# Patient Record
Sex: FEMALE | Race: WHITE | NOT HISPANIC OR LATINO | Employment: STUDENT | URBAN - METROPOLITAN AREA
[De-identification: names, ages, dates, MRNs, and addresses within clinical notes are randomized per-mention and may not be internally consistent; named-entity substitution may affect disease eponyms.]

---

## 2017-09-12 ENCOUNTER — HISTORICAL (OUTPATIENT)
Dept: RADIOLOGY | Facility: HOSPITAL | Age: 27
End: 2017-09-12

## 2017-09-21 ENCOUNTER — HISTORICAL (OUTPATIENT)
Dept: RADIOLOGY | Facility: HOSPITAL | Age: 27
End: 2017-09-21

## 2018-11-07 ENCOUNTER — HISTORICAL (OUTPATIENT)
Dept: LAB | Facility: HOSPITAL | Age: 28
End: 2018-11-07

## 2019-06-21 ENCOUNTER — HISTORICAL (OUTPATIENT)
Dept: RADIOLOGY | Facility: HOSPITAL | Age: 29
End: 2019-06-21

## 2019-06-25 ENCOUNTER — HISTORICAL (OUTPATIENT)
Dept: RADIOLOGY | Facility: HOSPITAL | Age: 29
End: 2019-06-25

## 2022-06-06 DIAGNOSIS — D64.9 ANEMIA: Primary | ICD-10-CM

## 2023-11-11 ENCOUNTER — HOSPITAL ENCOUNTER (EMERGENCY)
Facility: HOSPITAL | Age: 33
Discharge: HOME | End: 2023-11-11
Attending: STUDENT IN AN ORGANIZED HEALTH CARE EDUCATION/TRAINING PROGRAM
Payer: MEDICAID

## 2023-11-11 VITALS
TEMPERATURE: 99 F | WEIGHT: 140 LBS | DIASTOLIC BLOOD PRESSURE: 101 MMHG | BODY MASS INDEX: 26.43 KG/M2 | HEIGHT: 61 IN | RESPIRATION RATE: 18 BRPM | HEART RATE: 87 BPM | SYSTOLIC BLOOD PRESSURE: 141 MMHG | OXYGEN SATURATION: 97 %

## 2023-11-11 DIAGNOSIS — K04.7 DENTAL ABSCESS: Primary | ICD-10-CM

## 2023-11-11 PROCEDURE — 99284 EMERGENCY DEPT VISIT MOD MDM: CPT

## 2023-11-11 PROCEDURE — 41800 DRAINAGE OF GUM LESION: CPT

## 2023-11-11 PROCEDURE — 25000003 PHARM REV CODE 250: Performed by: NURSE PRACTITIONER

## 2023-11-11 RX ORDER — TRAMADOL HYDROCHLORIDE 50 MG/1
50 TABLET ORAL EVERY 8 HOURS PRN
Qty: 12 TABLET | Refills: 0 | Status: SHIPPED | OUTPATIENT
Start: 2023-11-11 | End: 2023-11-11 | Stop reason: SDUPTHER

## 2023-11-11 RX ORDER — LIDOCAINE HYDROCHLORIDE 20 MG/ML
JELLY TOPICAL
Status: COMPLETED | OUTPATIENT
Start: 2023-11-11 | End: 2023-11-11

## 2023-11-11 RX ORDER — CLINDAMYCIN HYDROCHLORIDE 300 MG/1
300 CAPSULE ORAL EVERY 8 HOURS
Qty: 21 CAPSULE | Refills: 0 | Status: SHIPPED | OUTPATIENT
Start: 2023-11-11 | End: 2023-11-18

## 2023-11-11 RX ORDER — LIDOCAINE HYDROCHLORIDE 20 MG/ML
5 SOLUTION OROPHARYNGEAL
Status: DISCONTINUED | OUTPATIENT
Start: 2023-11-11 | End: 2023-11-11 | Stop reason: HOSPADM

## 2023-11-11 RX ORDER — TRAMADOL HYDROCHLORIDE 50 MG/1
50 TABLET ORAL EVERY 8 HOURS PRN
Qty: 12 TABLET | Refills: 0 | Status: SHIPPED | OUTPATIENT
Start: 2023-11-11 | End: 2023-11-15

## 2023-11-11 RX ORDER — CHLORHEXIDINE GLUCONATE ORAL RINSE 1.2 MG/ML
15 SOLUTION DENTAL 2 TIMES DAILY
Qty: 300 ML | Refills: 0 | Status: SHIPPED | OUTPATIENT
Start: 2023-11-11 | End: 2023-11-21

## 2023-11-11 RX ORDER — CLINDAMYCIN HYDROCHLORIDE 300 MG/1
300 CAPSULE ORAL
Status: COMPLETED | OUTPATIENT
Start: 2023-11-11 | End: 2023-11-11

## 2023-11-11 RX ADMIN — LIDOCAINE HYDROCHLORIDE 10 ML: 20 JELLY TOPICAL at 03:11

## 2023-11-11 RX ADMIN — CLINDAMYCIN HYDROCHLORIDE 300 MG: 300 CAPSULE ORAL at 03:11

## 2023-11-11 NOTE — ED PROVIDER NOTES
Encounter Date: 11/11/2023       History     Chief Complaint   Patient presents with    Facial Swelling     C/o lower right jaw facial swelling X 1 day. States that she is on day 3 of a prescription of Amoxicillin     See MDM    The history is provided by the patient. No  was used.     Review of patient's allergies indicates:  No Known Allergies  No past medical history on file.  No past surgical history on file.  No family history on file.     Review of Systems   HENT:  Positive for dental problem.    All other systems reviewed and are negative.      Physical Exam     Initial Vitals [11/11/23 1435]   BP Pulse Resp Temp SpO2   (!) 141/101 87 18 98.6 °F (37 °C) 97 %      MAP       --         Physical Exam    Nursing note and vitals reviewed.  Constitutional: She appears well-developed and well-nourished.   HENT:   Mouth/Throat: No trismus in the jaw. Abnormal dentition. Dental abscesses (right lower) and dental caries present.   Eyes: Conjunctivae are normal.   Cardiovascular:  Normal rate.           Pulmonary/Chest: No respiratory distress.     Neurological: She is alert and oriented to person, place, and time.   Skin: Skin is warm and dry.   Psychiatric: She has a normal mood and affect.         ED Course   Nerve Block    Date/Time: 11/11/2023 4:05 PM  Location procedure was performed: [facility] EMERGENCY DEPARTMENT    Performed by: Sandhya Cardoso FNP  Authorized by: Sandhya Cardoso FNP  Assisting provider: Davi Ordoñez MD  Consent Done: Yes  Consent: Verbal consent obtained.  Consent given by: patient  Patient understanding: patient states understanding of the procedure being performed  Patient identity confirmed: name  Indications: pain relief  Body area: face/mouth  Nerve: inferior alveolar  Laterality: right    Patient sedated: no  Patient position: sitting  Needle size: 25 G  Location technique: anatomical landmarks  Local Anesthetic: bupivacaine 0.25% without epinephrine  Anesthetic  total: 1 mL  Complications: No  Specimens: No  Implants: No  Outcome: pain improved  Patient tolerance: Patient tolerated the procedure well with no immediate complications      I & D - Incision and Drainage    Date/Time: 11/11/2023 4:20 PM  Location procedure was performed: [facility] EMERGENCY DEPARTMENT    Performed by: Sandhya Cardoso FNP  Authorized by: Davi Ordoñez MD  Consent Done: Yes  Consent: Verbal consent obtained.  Risks and benefits: risks, benefits and alternatives were discussed  Consent given by: patient  Patient understanding: patient states understanding of the procedure being performed  Type: abscess  Body area: mouth  Location details: floor of mouth  Anesthesia: see MAR for details    Anesthesia:  Local Anesthetic: topical anesthetic    Patient sedated: no  Scalpel size: 11  Complexity: simple  Drainage: bloody  Drainage amount: scant  Wound treatment: incision and drainage  Complications: No  Specimens: No  Implants: No  Patient tolerance: Patient tolerated the procedure well with no immediate complications        Labs Reviewed - No data to display       Imaging Results    None          Medications   LIDOcaine HCl 2% oral solution 5 mL (has no administration in time range)   clindamycin capsule 300 mg (300 mg Oral Given 11/11/23 1511)   LIDOcaine HCl 2% urojet (10 mLs Mucous Membrane Given 11/11/23 1511)     Medical Decision Making  34 y/o female presents with right lower dental pain and facial swelling which seems to be getting worse. No fever. States on day 3 of augmentin that she got from urgent care. Can't see dentist yet due to not having funds for fee.     Dental block performed. Relief given. Drained area of abscess, mostly blood with some pus noted. Will switch to clindamycin since patient has now been on 3 days of augmentin. Will add peridex.     Risk  Prescription drug management.      Additional MDM:   Differential Diagnosis:   Other: The following diagnoses were also considered  and will be evaluated: dental abscess, dental caries and dental fracture.                            Clinical Impression:   Final diagnoses:  [K04.7] Dental abscess (Primary)        ED Disposition Condition    Discharge Stable          ED Prescriptions       Medication Sig Dispense Start Date End Date Auth. Provider    clindamycin (CLEOCIN) 300 MG capsule Take 1 capsule (300 mg total) by mouth every 8 (eight) hours. for 7 days 21 capsule 11/11/2023 11/18/2023 Sandhya Cardoso FNP    traMADoL (ULTRAM) 50 mg tablet Take 1 tablet (50 mg total) by mouth every 8 (eight) hours as needed for Pain. 12 tablet 11/11/2023 11/15/2023 Sandhya Cardoso FNP    chlorhexidine (PERIDEX) 0.12 % solution Use as directed 15 mLs in the mouth or throat 2 (two) times daily. for 10 days 300 mL 11/11/2023 11/21/2023 Sandhya Cardoso FNP          Follow-up Information       Follow up With Specialties Details Why Contact Info    Linn Antony NP Family Medicine Call in 1 week As needed, If symptoms worsen PO   [facility] [id number]  [phone]               Sandhya Cardoso FNP  11/11/23 0655

## 2023-11-11 NOTE — ED TRIAGE NOTES
C/o lower right jaw facial swelling X 1 day. States that she is on day 3 of a prescription of Amoxicillin

## 2023-11-11 NOTE — DISCHARGE INSTRUCTIONS
Peridex swish and spit as directed. Tylenol and/or ibuprofen for pain. May take tramadol for more severe pain as needed, do not take and drive. Follow up with dentist.

## 2024-08-03 ENCOUNTER — HOSPITAL ENCOUNTER (EMERGENCY)
Facility: HOSPITAL | Age: 34
Discharge: HOME | End: 2024-08-04
Attending: STUDENT IN AN ORGANIZED HEALTH CARE EDUCATION/TRAINING PROGRAM
Payer: COMMERCIAL

## 2024-08-03 VITALS
BODY MASS INDEX: 27.38 KG/M2 | RESPIRATION RATE: 18 BRPM | SYSTOLIC BLOOD PRESSURE: 140 MMHG | WEIGHT: 145 LBS | OXYGEN SATURATION: 98 % | TEMPERATURE: 99 F | HEIGHT: 61 IN | HEART RATE: 89 BPM | DIASTOLIC BLOOD PRESSURE: 85 MMHG

## 2024-08-03 DIAGNOSIS — R52 PAIN: ICD-10-CM
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
## 2024-08-03 DIAGNOSIS — S80.01XA CONTUSION OF RIGHT KNEE, INITIAL ENCOUNTER: ICD-10-CM

## 2024-08-03 PROCEDURE — 25000003 PHARM REV CODE 250: Performed by: STUDENT IN AN ORGANIZED HEALTH CARE EDUCATION/TRAINING PROGRAM

## 2024-08-03 PROCEDURE — 99283 EMERGENCY DEPT VISIT LOW MDM: CPT | Mod: 25

## 2024-08-03 RX ORDER — HYDROCODONE BITARTRATE AND ACETAMINOPHEN 5; 325 MG/1; MG/1
1 TABLET ORAL
Status: COMPLETED | OUTPATIENT
Start: 2024-08-03 | End: 2024-08-03

## 2024-08-03 RX ADMIN — HYDROCODONE BITARTRATE AND ACETAMINOPHEN 1 TABLET: 5; 325 TABLET ORAL at 11:08

## 2024-08-04 RX ORDER — TRAMADOL HYDROCHLORIDE 50 MG/1
50 TABLET ORAL EVERY 6 HOURS PRN
Qty: 12 TABLET | Refills: 0 | Status: SHIPPED | OUTPATIENT
Start: 2024-08-04

## 2025-04-07 ENCOUNTER — OFFICE VISIT (OUTPATIENT)
Dept: FAMILY MEDICINE | Facility: CLINIC | Age: 35
End: 2025-04-07
Payer: MEDICAID

## 2025-04-07 VITALS
BODY MASS INDEX: 24.17 KG/M2 | SYSTOLIC BLOOD PRESSURE: 117 MMHG | RESPIRATION RATE: 20 BRPM | HEIGHT: 61 IN | DIASTOLIC BLOOD PRESSURE: 77 MMHG | TEMPERATURE: 98 F | HEART RATE: 71 BPM | WEIGHT: 128 LBS

## 2025-04-07 DIAGNOSIS — F31.9 BIPOLAR 1 DISORDER, DEPRESSED: ICD-10-CM

## 2025-04-07 DIAGNOSIS — J30.9 ALLERGIC SINUSITIS: ICD-10-CM

## 2025-04-07 DIAGNOSIS — H60.90 OTITIS EXTERNA, UNSPECIFIED CHRONICITY, UNSPECIFIED LATERALITY, UNSPECIFIED TYPE: ICD-10-CM

## 2025-04-07 DIAGNOSIS — J34.2 DEVIATED SEPTUM: ICD-10-CM

## 2025-04-07 DIAGNOSIS — F32.A DEPRESSION, UNSPECIFIED DEPRESSION TYPE: ICD-10-CM

## 2025-04-07 RX ORDER — NEOMYCIN SULFATE, POLYMYXIN B SULFATE AND HYDROCORTISONE 10; 3.5; 1 MG/ML; MG/ML; [USP'U]/ML
3 SUSPENSION/ DROPS AURICULAR (OTIC) 3 TIMES DAILY
Qty: 10 ML | Refills: 0 | Status: SHIPPED | OUTPATIENT
Start: 2025-04-07 | End: 2025-04-14

## 2025-04-07 RX ORDER — MONTELUKAST SODIUM 10 MG/1
10 TABLET ORAL NIGHTLY
Qty: 30 TABLET | Refills: 6 | Status: SHIPPED | OUTPATIENT
Start: 2025-04-07 | End: 2025-11-03

## 2025-04-07 RX ORDER — LORATADINE 10 MG/1
10 TABLET ORAL DAILY
Qty: 30 TABLET | Refills: 6 | Status: SHIPPED | OUTPATIENT
Start: 2025-04-07 | End: 2026-04-07

## 2025-04-07 RX ORDER — LAMOTRIGINE 100 MG/1
100 TABLET ORAL DAILY
Qty: 30 TABLET | Refills: 11 | Status: SHIPPED | OUTPATIENT
Start: 2025-04-07 | End: 2026-04-07

## 2025-04-07 RX ORDER — FLUTICASONE PROPIONATE 50 MCG
1 SPRAY, SUSPENSION (ML) NASAL DAILY
Qty: 18 G | Refills: 6 | Status: SHIPPED | OUTPATIENT
Start: 2025-04-07

## 2025-04-07 RX ORDER — AZELASTINE 1 MG/ML
1 SPRAY, METERED NASAL 2 TIMES DAILY
Qty: 3 ML | Refills: 6 | Status: SHIPPED | OUTPATIENT
Start: 2025-04-07 | End: 2026-04-07

## 2025-04-07 RX ORDER — VENLAFAXINE HYDROCHLORIDE 75 MG/1
75 CAPSULE, EXTENDED RELEASE ORAL DAILY
Qty: 30 CAPSULE | Refills: 11 | Status: SHIPPED | OUTPATIENT
Start: 2025-04-07 | End: 2026-04-07

## 2025-04-07 RX ORDER — METHYLPREDNISOLONE ACETATE 40 MG/ML
40 INJECTION, SUSPENSION INTRA-ARTICULAR; INTRALESIONAL; INTRAMUSCULAR; SOFT TISSUE
Status: COMPLETED | OUTPATIENT
Start: 2025-04-07 | End: 2025-04-07

## 2025-04-07 RX ADMIN — METHYLPREDNISOLONE ACETATE 40 MG: 40 INJECTION, SUSPENSION INTRA-ARTICULAR; INTRALESIONAL; INTRAMUSCULAR; SOFT TISSUE at 10:04

## 2025-04-07 NOTE — PROGRESS NOTES
"Subjective:       Patient ID: Barbie Marlow is a 34 y.o. female.    Chief Complaint: Depression (Discuss getting back on medication), Otalgia (Earache left ear X's 2 weeks), and Sinus Problem (Sinus pressure, sinus drip X's 1 month)      The patient is a 34-year-old female who presents for depression and bipolar disorder.  The patient states she has been off of her medication for about a year but she feels like she really needs it.  She states she is feeling down most days and she feels isolated.  She also states that she does not feel like doing anything.  She is asking me to start her back up on Lamictal and Effexor.  I will start her on a low-dose of each and send her to psychiatric for medication management.    The patient is complaining of sinus problems.  Patient states she has a sinus headache, she has thick sinus drainage that she is having a hard time getting to come up.  She also states she has a left earache.      Review of [id number] point review of systems conducted, negative except as stated in the history of present illness. See HPI for details.      Objective:      Visit Vitals  /77   Pulse 71   Temp 98 °F (36.7 °C)   Resp 20   Ht 5' 1" (1.549 m)   Wt 58.1 kg (128 lb)   LMP 03/27/2025   BMI 24.19 kg/m²     Physical Exam  Vitals and nursing note reviewed.   HENT:      Head: Normocephalic.      Right Ear: Tympanic membrane normal.      Ears:      Comments: Left ear canal is erythemic and left TM is coated inner layer of wax.     Nose: Congestion and rhinorrhea present.      Mouth/Throat:      Pharynx: Oropharyngeal exudate and posterior oropharyngeal erythema present.   Eyes:      Extraocular Movements: Extraocular movements intact.   Cardiovascular:      Rate and Rhythm: Normal rate and regular rhythm.      Heart sounds: No murmur heard.  Pulmonary:      Effort: Pulmonary effort is normal.      Breath sounds: Normal breath sounds.   Abdominal:      General: Bowel sounds are normal.    "   Palpations: Abdomen is soft.   Musculoskeletal:         General: Normal range of motion.      Cervical back: Normal range of motion and neck supple.   Skin:     General: Skin is warm and dry.   Neurological:      Mental Status: She is alert and oriented to person, place, and time.       Current Outpatient Medications   Medication Instructions    azelastine (ASTELIN) 137 mcg, Nasal, 2 times daily    fluticasone propionate (FLONASE) 50 mcg, Each Nostril, Daily    lamoTRIgine (LAMICTAL) 100 mg, Oral, Daily    loratadine (CLARITIN) 10 mg, Oral, Daily    montelukast (SINGULAIR) 10 mg, Oral, Nightly    neomycin-polymyxin-hydrocortisone (CORTISPORIN) 3.5-10,000-1 mg/mL-unit/mL-% otic suspension 3 drops, Left Ear, 3 times daily    venlafaxine (EFFEXOR-XR) 75 mg, Oral, Daily     has no known allergies.       Assessment:         ICD-10-CM ICD-9-CM   1. Allergic sinusitis  J30.9 477.9   2. Deviated septum  J34.2 470   3. Depression, unspecified depression type  F32.A 311   4. Bipolar 1 disorder, depressed  F31.9 296.50   5. Otitis externa, unspecified chronicity, unspecified laterality, unspecified type  H60.90 380.10          Plan:       1. Allergic sinusitis  - montelukast (SINGULAIR) 10 mg tablet; Take 1 tablet (10 mg total) by mouth every evening.  Dispense: 30 tablet; Refill: 6  - loratadine (CLARITIN) 10 mg tablet; Take 1 tablet (10 mg total) by mouth once daily.  Dispense: 30 tablet; Refill: 6    2. Deviated septum  - Ambulatory referral/consult to ENT; Future  - azelastine (ASTELIN) 137 mcg (0.1 %) nasal spray; 1 spray (137 mcg total) by Nasal route 2 (two) times daily.  Dispense: 3 mL; Refill: 6  - fluticasone propionate (FLONASE) 50 mcg/actuation nasal spray; 1 spray (50 mcg total) by Each Nostril route once daily.  Dispense: 18 g; Refill: 6    3. Depression, unspecified depression type  - venlafaxine (EFFEXOR-XR) 75 MG 24 hr capsule; Take 1 capsule (75 mg total) by mouth once daily.  Dispense: 30 capsule; Refill:  11  - Ambulatory referral/consult to Psychiatry; Future    4. Bipolar 1 disorder, depressed  - lamoTRIgine (LAMICTAL) 100 MG tablet; Take 1 tablet (100 mg total) by mouth once daily.  Dispense: 30 tablet; Refill: 11  - Ambulatory referral/consult to Psychiatry; Future    5. Otitis externa, unspecified chronicity, unspecified laterality, unspecified type  - neomycin-polymyxin-hydrocortisone (CORTISPORIN) 3.5-10,000-1 mg/mL-unit/mL-% otic suspension; Place 3 drops into the left ear 3 (three) times daily. for 7 days  Dispense: 10 mL; Refill: 0        Follow up in about 4 weeks (around 5/5/2025), or if symptoms worsen or fail to improve.     Future Appointments   Date Time Provider Department Center   4/28/2025  9:45 AM Nan Chao NP [facility]

## 2025-06-06 ENCOUNTER — HOSPITAL ENCOUNTER (EMERGENCY)
Facility: HOSPITAL | Age: 35
Discharge: HOME | End: 2025-06-06
Attending: FAMILY MEDICINE
Payer: MEDICAID

## 2025-06-06 VITALS
SYSTOLIC BLOOD PRESSURE: 132 MMHG | RESPIRATION RATE: 20 BRPM | OXYGEN SATURATION: 99 % | TEMPERATURE: 98 F | HEART RATE: 79 BPM | WEIGHT: 129 LBS | DIASTOLIC BLOOD PRESSURE: 78 MMHG | BODY MASS INDEX: 24.35 KG/M2 | HEIGHT: 61 IN

## 2025-06-06 DIAGNOSIS — M54.10 RADICULOPATHY WITH LOWER EXTREMITY SYMPTOMS: Primary | ICD-10-CM

## 2025-06-06 PROCEDURE — 99284 EMERGENCY DEPT VISIT MOD MDM: CPT

## 2025-06-06 RX ORDER — GABAPENTIN 300 MG/1
300 CAPSULE ORAL NIGHTLY
Qty: 20 CAPSULE | Refills: 0 | Status: SHIPPED | OUTPATIENT
Start: 2025-06-06 | End: 2025-06-26

## 2025-06-06 RX ORDER — CYCLOBENZAPRINE HCL 10 MG
10 TABLET ORAL 3 TIMES DAILY PRN
Qty: 15 TABLET | Refills: 0 | Status: SHIPPED | OUTPATIENT
Start: 2025-06-06 | End: 2025-06-11

## 2025-06-06 NOTE — ED NOTES
Pt ambulated to ed rm 3 from [facility]. Aaox4. Reports right leg pain that goes from buttocks to knee laterally. Denies any trauma. States it feels like a tingling and numbness. In no distress. Wctm

## 2025-06-06 NOTE — ED PROVIDER NOTES
Encounter Date: 6/6/2025       History     Chief Complaint   Patient presents with    Leg Pain     Right upper leg pain constant started after she worked out at gym. LOP 7/10     This patient is a 35-year-old female who comes in with right upper leg pain that is constant after working out of the gym.  Patient has a history of radiculopathy from that right hip down to her knee.  She states that she was given several shots at the same time in that right hip and she had nerve damage after that.  Now states that she has been working out and it feels the same again, shooting pain going down to the right knee    The history is provided by the patient.     Review of patient's allergies indicates:  No Known Allergies  History reviewed. No pertinent past medical history.  History reviewed. No pertinent surgical history.  No family history on file.  Social History[1]  Review of Systems   Constitutional: Negative.    HENT: Negative.     Respiratory: Negative.     Cardiovascular: Negative.    Gastrointestinal: Negative.    Endocrine: Negative.    Genitourinary: Negative.    Musculoskeletal: Negative.         Pain in the right hip shooting to the right knee   Neurological: Negative.    Psychiatric/Behavioral: Negative.     All other systems reviewed and are negative.      Physical Exam     Initial Vitals [06/06/25 1152]   BP Pulse Resp Temp SpO2   132/78 79 20 97.5 °F (36.4 °C) 99 %      MAP       --         Physical Exam    Nursing note and vitals reviewed.  Constitutional: She appears well-developed.   HENT:   Head: Normocephalic.   Eyes: Pupils are equal, round, and reactive to light.   Neck:   Normal range of motion.  Cardiovascular:  Normal rate, regular rhythm and normal heart sounds.           Pulmonary/Chest: Breath sounds normal.   Abdominal: Abdomen is soft.   Musculoskeletal:         General: Normal range of motion.      Cervical back: Normal range of motion.        Legs:      Neurological: She is alert and oriented  to person, place, and time. GCS score is 15. GCS eye subscore is 4. GCS verbal subscore is 5. GCS motor subscore is 6.   Skin: Skin is warm and dry.   Psychiatric: She has a normal mood and affect.         ED Course   Procedures  Labs Reviewed - No data to display       Imaging Results    None          Medications - No data to display  Medical Decision Making  This patient is a 35-year-old female who comes in for right hip pain shooting to the right knee  Differential diagnosis:  Radiculopathy, right hip strain  Patient states that she was treated with a steroid shot last week and she feels a little bit of improvement but she still having shooting pain    Risk  Prescription drug management.                                      Clinical Impression:  Final diagnoses:  [M54.10] Radiculopathy with lower extremity symptoms (Primary)          ED Disposition Condition    Discharge Stable          ED Prescriptions       Medication Sig Dispense Start Date End Date Auth. Provider    gabapentin (NEURONTIN) 300 MG capsule Take 1 capsule (300 mg total) by mouth every evening. for 20 days 20 capsule 6/6/2025 6/26/2025 Ashley Armstrong MD    cyclobenzaprine (FLEXERIL) 10 MG tablet Take 1 tablet (10 mg total) by mouth 3 (three) times daily as needed for Muscle spasms. 15 tablet 6/6/2025 6/11/2025 Ashley Armstrong MD          Follow-up Information       Follow up With Specialties Details Why Contact Info    Nan Chao, JANET Family Medicine Schedule an appointment as soon as possible for a visit in 2 days  [address] [place] [id number]  [phone]                     [1]   Social History  Tobacco Use    Smoking status: Never    Smokeless tobacco: Never        Ashley Armstrong MD  06/06/25 1591

## 2025-06-15 ENCOUNTER — HOSPITAL ENCOUNTER (EMERGENCY)
Facility: HOSPITAL | Age: 35
Discharge: HOME | End: 2025-06-15
Attending: GENERAL PRACTICE
Payer: MEDICAID

## 2025-06-15 VITALS
RESPIRATION RATE: 20 BRPM | TEMPERATURE: 98 F | DIASTOLIC BLOOD PRESSURE: 98 MMHG | BODY MASS INDEX: 24.55 KG/M2 | WEIGHT: 130 LBS | HEART RATE: 112 BPM | OXYGEN SATURATION: 95 % | SYSTOLIC BLOOD PRESSURE: 149 MMHG | HEIGHT: 61 IN

## 2025-06-15 DIAGNOSIS — K08.89 TOOTHACHE: Primary | ICD-10-CM

## 2025-06-15 DIAGNOSIS — K08.89 PAIN, DENTAL: ICD-10-CM

## 2025-06-15 PROCEDURE — 25000003 PHARM REV CODE 250: Performed by: GENERAL PRACTICE

## 2025-06-15 PROCEDURE — 99284 EMERGENCY DEPT VISIT MOD MDM: CPT

## 2025-06-15 RX ORDER — NABUMETONE 500 MG/1
500 TABLET, FILM COATED ORAL 2 TIMES DAILY PRN
Qty: 20 TABLET | Refills: 0 | Status: SHIPPED | OUTPATIENT
Start: 2025-06-15 | End: 2025-06-15

## 2025-06-15 RX ORDER — NABUMETONE 500 MG/1
500 TABLET, FILM COATED ORAL 2 TIMES DAILY PRN
Qty: 20 TABLET | Refills: 0 | Status: SHIPPED | OUTPATIENT
Start: 2025-06-15

## 2025-06-15 RX ORDER — KETOROLAC TROMETHAMINE 10 MG/1
10 TABLET, FILM COATED ORAL
Status: COMPLETED | OUTPATIENT
Start: 2025-06-15 | End: 2025-06-15

## 2025-06-15 RX ADMIN — KETOROLAC TROMETHAMINE 10 MG: 10 TABLET, FILM COATED ORAL at 01:06

## 2025-06-15 NOTE — ED PROVIDER NOTES
Encounter Date: 6/15/2025       History     Chief Complaint   Patient presents with    Dental Pain     Right lower dental pain x 4 days. Denies fever     Right lower dental pain x 4 days. Denies fever    The history is provided by the patient.   Dental Pain  The primary symptoms include mouth pain. The symptoms began several days ago. The symptoms are waxing and waning. The symptoms are new.   Mouth pain began more than 1 week ago. Mouth pain occurs constantly. Affected locations include: teeth. At its highest the mouth pain was at 6/10. The mouth pain is currently at 6/10.   Additional symptoms include: dental sensitivity to temperature. Medical issues include: smoking.     Review of patient's allergies indicates:  No Known Allergies  History reviewed. No pertinent past medical history.  History reviewed. No pertinent surgical history.  No family history on file.  Social History[1]  Review of Systems   Constitutional: Negative.    HENT:  Positive for dental problem.    Eyes: Negative.    Respiratory: Negative.     Cardiovascular: Negative.    Gastrointestinal: Negative.    Endocrine: Negative.    Genitourinary: Negative.    Musculoskeletal: Negative.    Skin: Negative.    Allergic/Immunologic: Negative.    Neurological: Negative.    Hematological: Negative.    Psychiatric/Behavioral: Negative.     All other systems reviewed and are negative.      Physical Exam     Initial Vitals [06/15/25 1249]   BP Pulse Resp Temp SpO2   (!) 138/106 (!) 112 20 98.2 °F (36.8 °C) 95 %      MAP       --         Physical Exam    Nursing note and vitals reviewed.  Constitutional: She appears well-developed and well-nourished.   HENT:   Head: Normocephalic and atraumatic. Mouth/Throat: Dental caries present.       Eyes: EOM are normal. Pupils are equal, round, and reactive to light.   Neck: Neck supple.   Normal range of motion.  Cardiovascular:  Normal rate, regular rhythm, normal heart sounds and intact distal pulses.            Pulmonary/Chest: Breath sounds normal.   Abdominal: Abdomen is soft. Bowel sounds are normal.   Musculoskeletal:         General: Normal range of motion.      Cervical back: Normal range of motion and neck supple.     Neurological: She is alert and oriented to person, place, and time. She has normal strength. GCS score is 15. GCS eye subscore is 4. GCS verbal subscore is 5. GCS motor subscore is 6.   Skin: Skin is warm and dry.   Psychiatric: She has a normal mood and affect. Her behavior is normal. Judgment and thought content normal.         ED Course   Procedures  Labs Reviewed - No data to display       Imaging Results    None          Medications   ketorolac tablet 10 mg (has no administration in time range)     Medical Decision Making  Severe dental caries of the right lower jaw.  Patient has a dental follow up in July.  We will provide analgesia.  There is no indication for antibiotics at this time as there is no evidence of infection.    Risk  Prescription drug management.                                      Clinical Impression:  Final diagnoses:  [K08.89] Toothache (Primary)  [K08.89] Pain, dental          ED Disposition Condition    Discharge Stable          ED Prescriptions       Medication Sig Dispense Start Date End Date Auth. Provider    nabumetone (RELAFEN) 500 MG tablet Take 1 tablet (500 mg total) by mouth 2 (two) times daily as needed for Pain. Take with food 20 tablet 6/15/2025 -- Rj Polo MD          Follow-up Information       Follow up With Specialties Details Why Contact Info    Nan Chao NP Family Medicine Call in 3 days As needed [address] [facility] [id number] [phone]                     [1]   Social History  Tobacco Use    Smoking status: Never    Smokeless tobacco: Never        Rj Polo MD  06/15/25 7385

## 2025-06-16 ENCOUNTER — OFFICE VISIT (OUTPATIENT)
Dept: FAMILY MEDICINE | Facility: CLINIC | Age: 35
End: 2025-06-16
Payer: MEDICAID

## 2025-06-16 ENCOUNTER — HOSPITAL ENCOUNTER (OUTPATIENT)
Dept: RADIOLOGY | Facility: HOSPITAL | Age: 35
Discharge: HOME | End: 2025-06-16
Attending: NURSE PRACTITIONER
Payer: MEDICAID

## 2025-06-16 VITALS
SYSTOLIC BLOOD PRESSURE: 125 MMHG | TEMPERATURE: 98 F | WEIGHT: 133 LBS | DIASTOLIC BLOOD PRESSURE: 84 MMHG | BODY MASS INDEX: 25.11 KG/M2 | HEIGHT: 61 IN | RESPIRATION RATE: 20 BRPM | HEART RATE: 102 BPM

## 2025-06-16 DIAGNOSIS — M25.551 RIGHT HIP PAIN: ICD-10-CM

## 2025-06-16 DIAGNOSIS — M54.31 RIGHT SIDED SCIATICA: ICD-10-CM

## 2025-06-16 PROCEDURE — 1160F RVW MEDS BY RX/DR IN RCRD: CPT | Mod: CPTII,,, | Performed by: NURSE PRACTITIONER

## 2025-06-16 PROCEDURE — 73502 X-RAY EXAM HIP UNI 2-3 VIEWS: CPT | Mod: TC,RT

## 2025-06-16 PROCEDURE — 3008F BODY MASS INDEX DOCD: CPT | Mod: CPTII,,, | Performed by: NURSE PRACTITIONER

## 2025-06-16 PROCEDURE — 3079F DIAST BP 80-89 MM HG: CPT | Mod: CPTII,,, | Performed by: NURSE PRACTITIONER

## 2025-06-16 PROCEDURE — 1159F MED LIST DOCD IN RCRD: CPT | Mod: CPTII,,, | Performed by: NURSE PRACTITIONER

## 2025-06-16 PROCEDURE — 3074F SYST BP LT 130 MM HG: CPT | Mod: CPTII,,, | Performed by: NURSE PRACTITIONER

## 2025-06-16 PROCEDURE — 99214 OFFICE O/P EST MOD 30 MIN: CPT | Mod: ,,, | Performed by: NURSE PRACTITIONER

## 2025-06-16 RX ORDER — AMOXICILLIN 500 MG/1
500 CAPSULE ORAL 3 TIMES DAILY
COMMUNITY
Start: 2025-06-16

## 2025-06-16 RX ORDER — CYCLOBENZAPRINE HCL 10 MG
10 TABLET ORAL 3 TIMES DAILY PRN
Qty: 90 TABLET | Refills: 1 | Status: SHIPPED | OUTPATIENT
Start: 2025-06-16 | End: 2025-06-16 | Stop reason: SDUPTHER

## 2025-06-16 RX ORDER — IBUPROFEN 600 MG/1
600 TABLET, FILM COATED ORAL 3 TIMES DAILY
COMMUNITY
Start: 2025-06-16

## 2025-06-16 RX ORDER — HYDROCODONE BITARTRATE AND ACETAMINOPHEN 5; 325 MG/1; MG/1
2 TABLET ORAL 2 TIMES DAILY
COMMUNITY
Start: 2025-06-16

## 2025-06-16 RX ORDER — GABAPENTIN 300 MG/1
300 CAPSULE ORAL 3 TIMES DAILY
Qty: 90 CAPSULE | Refills: 11 | Status: SHIPPED | OUTPATIENT
Start: 2025-06-16 | End: 2025-06-16 | Stop reason: SDUPTHER

## 2025-06-16 RX ORDER — CYCLOBENZAPRINE HCL 10 MG
10 TABLET ORAL 3 TIMES DAILY PRN
Qty: 90 TABLET | Refills: 1 | Status: SHIPPED | OUTPATIENT
Start: 2025-06-16 | End: 2025-08-15

## 2025-06-16 RX ORDER — GABAPENTIN 300 MG/1
300 CAPSULE ORAL 3 TIMES DAILY
Qty: 90 CAPSULE | Refills: 11 | Status: SHIPPED | OUTPATIENT
Start: 2025-06-16 | End: 2026-06-16

## 2025-06-16 NOTE — PROGRESS NOTES
"Subjective:       Patient ID: Barbie Marlow is a 35 y.o. female.    Chief Complaint: Hip Pain (Follow up hip pain X's 2 weeks)      The patient is a 35-year-old female who presents for right hip pain with sciatic nerve involvement.  The patient states the sciatica goes down to the right knee.  The patient went to the emergency department on June 6 for the right hip pain.  She was given gabapentin and cyclobenzaprine.  She was told to take the gabapentin once daily.  She was not taking the cyclobenzaprine at all.  She states gabapentin only lasts about 3 hours.  Instructed the patient to take the gabapentin and cyclobenzaprine together.  I will order an x-ray.    Hip Pain   The incident occurred more than 1 week ago. The incident occurred at home. There was no injury mechanism. The pain is present in the right hip. The quality of the pain is described as aching, burning, cramping, shooting and stabbing. The pain is at a severity of 9/10. The pain is severe. The pain has been Constant since onset. Associated symptoms include an inability to bear weight, a loss of motion, muscle weakness, numbness and tingling. She reports no foreign bodies present. The symptoms are aggravated by movement, palpation and weight bearing. She has tried acetaminophen, ice, heat, non-weight bearing and NSAIDs for the symptoms. The treatment provided no relief.     Review of Systems   Neurological:  Positive for tingling and numbness.   12 point review of systems conducted, negative except as stated in the history of present illness. See HPI for details.      Objective:      Visit Vitals  /84   Pulse 102   Temp 98.1 °F (36.7 °C)   Resp 20   Ht 5' 1" (1.549 m)   Wt 60.3 kg (133 lb)   LMP 05/26/2025   BMI 25.13 kg/m²     Physical Exam  Vitals and nursing note reviewed.   HENT:      Head: Normocephalic.      Right Ear: Tympanic membrane normal.      Left Ear: Tympanic membrane normal.      Nose: Nose normal.      Mouth/Throat: "      Mouth: Mucous membranes are moist.   Eyes:      Extraocular Movements: Extraocular movements intact.   Cardiovascular:      Rate and Rhythm: Normal rate and regular rhythm.      Heart sounds: No murmur heard.  Pulmonary:      Effort: Pulmonary effort is normal.      Breath sounds: Normal breath sounds.   Abdominal:      Palpations: Abdomen is soft.   Musculoskeletal:      Cervical back: Normal range of motion and neck supple.      Comments: Limited range of motion right hip due to pain   Skin:     General: Skin is warm and dry.   Neurological:      Mental Status: She is alert and oriented to person, place, and time.       Current Outpatient Medications   Medication Instructions    amoxicillin (AMOXIL) 500 mg, 3 times daily    azelastine (ASTELIN) 137 mcg, Nasal, 2 times daily    cyclobenzaprine (FLEXERIL) 10 mg, Oral, 3 times daily PRN    fluticasone propionate (FLONASE) 50 mcg, Each Nostril, Daily    gabapentin (NEURONTIN) 300 mg, Oral, 3 times daily    HYDROcodone-acetaminophen (NORCO) 5-325 mg per tablet 2 tablets, 2 times daily    ibuprofen (ADVIL,MOTRIN) 600 mg, 3 times daily    lamoTRIgine (LAMICTAL) 100 mg, Oral, Daily    loratadine (CLARITIN) 10 mg, Oral, Daily    montelukast (SINGULAIR) 10 mg, Oral, Nightly    nabumetone (RELAFEN) 500 mg, Oral, 2 times daily PRN, Take with food    venlafaxine (EFFEXOR-XR) 75 mg, Oral, Daily     has no known allergies.       Assessment:         ICD-10-CM ICD-9-CM   1. Right hip pain  M25.551 719.45   2. Right sided sciatica  M54.31 724.3          Plan:       1. Right hip pain  - gabapentin (NEURONTIN) 300 MG capsule; Take 1 capsule (300 mg total) by mouth 3 (three) times daily.  Dispense: 90 capsule; Refill: 11  - cyclobenzaprine (FLEXERIL) 10 MG tablet; Take 1 tablet (10 mg total) by mouth 3 (three) times daily as needed for Muscle spasms.  Dispense: 90 tablet; Refill: 1  - X-Ray Hip 2 or 3 views Right with Pelvis when performed; Future    2. Right sided sciatica  -  gabapentin (NEURONTIN) 300 MG capsule; Take 1 capsule (300 mg total) by mouth 3 (three) times daily.  Dispense: 90 capsule; Refill: 11  - cyclobenzaprine (FLEXERIL) 10 MG tablet; Take 1 tablet (10 mg total) by mouth 3 (three) times daily as needed for Muscle spasms.  Dispense: 90 tablet; Refill: 1  - X-Ray Hip 2 or 3 views Right with Pelvis when performed; Future      Call patient results of hip x-ray.  Intervene with PT and possible MRI    Follow up in about 6 months (around 12/16/2025).     Future Appointments   Date Time Provider Department Center   12/16/2025  1:00 PM Nan Chao NP [facility]

## 2025-06-17 ENCOUNTER — TELEPHONE (OUTPATIENT)
Dept: FAMILY MEDICINE | Facility: CLINIC | Age: 35
End: 2025-06-17
Payer: MEDICAID

## 2025-06-17 DIAGNOSIS — M54.31 RIGHT SIDED SCIATICA: Primary | ICD-10-CM

## 2025-06-17 DIAGNOSIS — M25.551 RIGHT HIP PAIN: ICD-10-CM

## 2025-06-17 NOTE — TELEPHONE ENCOUNTER
I called the patient and left her a voicemail about the x-ray of the right hip.  Shows minimal degenerative changes.  However the patient has significant pain so I will order an MRI.

## 2025-06-24 ENCOUNTER — HOSPITAL ENCOUNTER (OUTPATIENT)
Dept: RADIOLOGY | Facility: HOSPITAL | Age: 35
Discharge: HOME | End: 2025-06-24
Attending: NURSE PRACTITIONER
Payer: MEDICAID

## 2025-06-24 DIAGNOSIS — M54.31 RIGHT SIDED SCIATICA: ICD-10-CM

## 2025-06-24 DIAGNOSIS — M25.551 RIGHT HIP PAIN: ICD-10-CM

## 2025-06-24 PROCEDURE — 73721 MRI JNT OF LWR EXTRE W/O DYE: CPT | Mod: TC,RT

## 2025-07-01 ENCOUNTER — TELEPHONE (OUTPATIENT)
Dept: FAMILY MEDICINE | Facility: CLINIC | Age: 35
End: 2025-07-01
Payer: MEDICAID

## 2025-07-01 ENCOUNTER — OFFICE VISIT (OUTPATIENT)
Dept: OBSTETRICS AND GYNECOLOGY | Facility: CLINIC | Age: 35
End: 2025-07-01
Payer: MEDICAID

## 2025-07-01 VITALS
WEIGHT: 134 LBS | BODY MASS INDEX: 25.3 KG/M2 | SYSTOLIC BLOOD PRESSURE: 122 MMHG | DIASTOLIC BLOOD PRESSURE: 70 MMHG | HEIGHT: 61 IN

## 2025-07-01 DIAGNOSIS — R10.2 RIGHT ADNEXAL TENDERNESS: ICD-10-CM

## 2025-07-01 DIAGNOSIS — M70.61 TROCHANTERIC BURSITIS OF RIGHT HIP: Primary | ICD-10-CM

## 2025-07-01 DIAGNOSIS — Z98.51 HISTORY OF BILATERAL TUBAL LIGATION: ICD-10-CM

## 2025-07-01 DIAGNOSIS — Z01.419 WELL WOMAN EXAM WITH ROUTINE GYNECOLOGICAL EXAM: Primary | ICD-10-CM

## 2025-07-01 DIAGNOSIS — Z11.3 SCREENING EXAMINATION FOR STD (SEXUALLY TRANSMITTED DISEASE): ICD-10-CM

## 2025-07-01 DIAGNOSIS — Z12.4 CERVICAL CANCER SCREENING: ICD-10-CM

## 2025-07-01 PROCEDURE — 87491 CHLMYD TRACH DNA AMP PROBE: CPT

## 2025-07-01 PROCEDURE — 87624 HPV HI-RISK TYP POOLED RSLT: CPT

## 2025-07-01 PROCEDURE — 87661 TRICHOMONAS VAGINALIS AMPLIF: CPT

## 2025-07-01 PROCEDURE — 87591 N.GONORRHOEAE DNA AMP PROB: CPT

## 2025-07-01 RX ORDER — METHYLPREDNISOLONE 4 MG/1
TABLET ORAL
Qty: 21 EACH | Refills: 0 | Status: SHIPPED | OUTPATIENT
Start: 2025-07-01 | End: 2025-07-22

## 2025-07-01 NOTE — PROGRESS NOTES
"Chief Complaint:   Well Woman     History of Present Illness:  Barbie Marlow is a 35 y.o. year old  presents for her well woman exam and establishment of care. Currently relying on BTL for birth control. Patient's last menstrual period was 2025 (approximate). C/o intermittent "sharp, flipping sensation" that occurs a few times a month with sudden movements to right lower quadrant.    Cancer-related family history includes Cervical cancer in her maternal aunt and maternal grandmother.          Gyn History:  Menstrual History  Cycle: Yes (LMP: 25)  Menarche Age: 1 years  Flow Duration: 5  Flow: Normal  Interval: 28  Intermenstrual Bleeding: No  Dysmenorrhea: No    Menopause  Menopause Age: 1 years    Pap History  Last pap date:  (Pt unsure, states probably about 10 years ago)  History of Abnormal Pap: (!) Yes  Treatment used: Colpo, other (Pt states they had to freeze her cervix)  HPV Vaccine Completed: No (/3)    Sammy Martinez  Sexually Active: Yes  Sexual Orientation: heterosexual  Postcoital Bleeding: No  Dyspareunia: No  STI History: No  Contraception: Yes  Contraception Type: Condoms    Breast History  Last Breast Imaging Date: No  History of Breast Biopsy: No        Review of Systems:  General/Constitutional: Chills denies. Fatigue/weakness denies. Fever denies. Night sweats denies. Hot flashes denies    Respiratory: Cough denies. Hemoptysis denies. SOB denies. Sputum production denies. Wheezing denies .   Cardiovascular: Chest pain denies. Dizziness denies. Palpitations denies. Swelling in hands/feet denies.                Breast: Dimpling denies. Breast mass denies. Breast pain/tenderness denies. Nipple discharge denies. Puckering denies.  Gastrointestinal: Abdominal pain denies. Blood in stool denies. Constipation denies. Diarrhea denies. Heartburn denies. Nausea denies. Vomiting denies    Genitourinary: Incontinence denies. Blood in urine denies. Frequent urination denies. " "Painful urination denies. Urinary urgency denies. Nocturia denies    Gynecologic: Irregular menses denies. Heavy bleeding denies. Painful menses denies. Vaginal discharge denies. Vaginal odor denies. Vaginal itching denies. Vaginal lesion denies. Pelvic pain intermittent to right side Decreased libido denies. Vulvar lesion denies. Prolapse of genital organs denies. Painful intercourse denies. Postcoital bleeding denies    Psychiatric: Depression denies. Anxiety denies.     OB History    Para Term  AB Living   4 4 4 0 0 4   SAB IAB Ectopic Multiple Live Births   0 0 0 0 4      # 1 - Date: 04, Sex: None, Weight: None, GA: None, Type: Vaginal, Spontaneous, Apgar1: None, Apgar5: None, Living: Living, Birth Comments: None    # 2 - Date: 06, Sex: None, Weight: None, GA: None, Type: Vaginal, Spontaneous, Apgar1: None, Apgar5: None, Living: Living, Birth Comments: None    # 3 - Date: 02/08/10, Sex: None, Weight: None, GA: None, Type: , Low Transverse, Apgar1: None, Apgar5: None, Living: Living, Birth Comments: None    # 4 - Date: 11, Sex: None, Weight: None, GA: None, Type: , Low Transverse, Apgar1: None, Apgar5: None, Living: Living, Birth Comments: None      History reviewed. No pertinent past medical history.  Current Medications[1]    Review of patient's allergies indicates:  No Known Allergies    Past Surgical History:   Procedure Laterality Date    Kidney stent Right     2018    TUBAL LIGATION       Family History   Problem Relation Name Age of Onset    Cervical cancer Maternal Grandmother      Cervical cancer Maternal Aunt       Social History[2]    Physical Exam:  /70 (BP Location: Right arm, Patient Position: Sitting)   Ht 5' 1" (1.549 m)   Wt 60.8 kg (134 lb)   LMP 2025 (Approximate)   BMI 25.32 kg/m²     Chaperone: present.       General appearance: healthy, well-nourished and well-developed     Psychiatric: Orientation to time, place and person. " Normal mood and affect and active, alert     Skin: Appearance: no rashes or lesions.     Neck:   Neck: supple, FROM, trachea midline. and no masses   Thyroid: no enlargement or nodules and non-tender.       Cardiovascular:   Auscultation: RRR and no murmur.   Peripheral Vascular: no varicosities, LLE edema, RLE edema, calf tenderness, and palpable cord and pedal pulses intact.     Lungs:   Respiratory effort: no intercostal retractions or accessory muscle usage.   Auscultation: no wheezing, rales/crackles, or rhonchi and clear to auscultation.     Breast:   Inspection/Palpation: no tenderness, discrete/distinct masses, skin changes, or abnormal secretions. Nipple appearance normal.     Abdomen:   Auscultation/Inspection/Palpation: no hepatomegaly, splenomegaly, masses, tenderness or CVA tenderness and soft, non-distended bowel sounds preset.    Hernia: no palpable hernias.     Female Genitalia:    Vulva: no masses, tenderness or lesions    Bladder/Urethra: no urethral discharge or mass, normal meatus, bladder non-distended.    Vagina: no tenderness, erythema, cystocele, rectocele, abnormal vaginal discharge or vesicle(s) or ulcers    Cervix: no discharge, no cervical lacerations noted or motion tenderness and grossly normal    Uterus: normal size and shape and midline, non-tender, and no uterine prolapse.    Adnexa/Parametria: no parametrial tenderness or mass, no adnexal tenderness or ovarian mass.     Lymph Nodes:   Palpation: non tender submandibular nodes, axillary nodes, or inguinal nodes.     Rectal Exam:   Rectum: normal perianal skin.       Assessment/Plan:  1. Well woman exam with routine gynecological exam    2. Right adnexal tenderness  -     US Pelvis Comp with Transvag NON-OB (xpd; Future; Expected date: 07/08/2025    3. History of bilateral tubal ligation    4. Screening examination for STD (sexually transmitted disease)    5. Cervical cancer screening         Pap with leuk  Recommend BSE  monthly  Discussed recommendations of annual screening after age of 40 with mammogram    Explained that screening is not 100% reliable. Advised patient if she notices any changes to her breast including a lump, mass, dimpling, discharge, rash, or tenderness she should contact us immediately.     Healthy diet, exercise   Multivitamin   Seat belt   Sunscreen use   Safe sex/ STI education   Contraception evaluation: BTL  Gardasil evaluation: 1/3    Pelvic US    Discussed the various types of STDs, related symptoms and the potential consequences (including effects on fertility) of STD infections.       Reviewed ways to limit exposure and prevention techniques.       Cultures: gc/cz/tv    Labs: declined    Counseling:     A brief discussion of STD prevention was had.    Avoidance of cigarette smoking, alcohol use, and drug use was encouraged.    A healthy diet and regular exercise was stressed.    All questions were answered and the patient voiced understanding of the above issues    RTC PRN or pending results.                       [1]   Current Outpatient Medications:     azelastine (ASTELIN) 137 mcg (0.1 %) nasal spray, 1 spray (137 mcg total) by Nasal route 2 (two) times daily., Disp: 3 mL, Rfl: 6    cyclobenzaprine (FLEXERIL) 10 MG tablet, Take 1 tablet (10 mg total) by mouth 3 (three) times daily as needed for Muscle spasms., Disp: 90 tablet, Rfl: 1    fluticasone propionate (FLONASE) 50 mcg/actuation nasal spray, 1 spray (50 mcg total) by Each Nostril route once daily., Disp: 18 g, Rfl: 6    gabapentin (NEURONTIN) 300 MG capsule, Take 1 capsule (300 mg total) by mouth 3 (three) times daily., Disp: 90 capsule, Rfl: 11    ibuprofen (ADVIL,MOTRIN) 600 MG tablet, Take 600 mg by mouth 3 (three) times daily., Disp: , Rfl:     lamoTRIgine (LAMICTAL) 100 MG tablet, Take 1 tablet (100 mg total) by mouth once daily., Disp: 30 tablet, Rfl: 11    loratadine (CLARITIN) 10 mg tablet, Take 1 tablet (10 mg total) by mouth once  daily., Disp: 30 tablet, Rfl: 6    montelukast (SINGULAIR) 10 mg tablet, Take 1 tablet (10 mg total) by mouth every evening., Disp: 30 tablet, Rfl: 6    nabumetone (RELAFEN) 500 MG tablet, Take 1 tablet (500 mg total) by mouth 2 (two) times daily as needed for Pain. Take with food, Disp: 20 tablet, Rfl: 0    venlafaxine (EFFEXOR-XR) 75 MG 24 hr capsule, Take 1 capsule (75 mg total) by mouth once daily., Disp: 30 capsule, Rfl: 11    amoxicillin (AMOXIL) 500 MG capsule, Take 500 mg by mouth 3 (three) times daily., Disp: , Rfl:     HYDROcodone-acetaminophen (NORCO) 5-325 mg per tablet, Take 2 tablets by mouth 2 (two) times daily. (Patient not taking: Reported on 7/1/2025), Disp: , Rfl:   [2]   Social History  Socioeconomic History    Marital status: Single   Tobacco Use    Smoking status: Never     Passive exposure: Never    Smokeless tobacco: Never   Substance and Sexual Activity    Alcohol use: Not Currently    Drug use: Not Currently    Sexual activity: Yes     Partners: Male     Birth control/protection: Condom, See Surgical Hx     Social Drivers of Health     Financial Resource Strain: Medium Risk (4/7/2025)    Overall Financial Resource Strain (CARDIA)     Difficulty of Paying Living Expenses: Somewhat hard   Food Insecurity: Food Insecurity Present (4/7/2025)    Hunger Vital Sign     Worried About Running Out of Food in the Last Year: Sometimes true     Ran Out of Food in the Last Year: Never true   Transportation Needs: Unmet Transportation Needs (4/7/2025)    PRAPARE - Transportation     Lack of Transportation (Medical): Yes     Lack of Transportation (Non-Medical): Yes   Physical Activity: Insufficiently Active (4/7/2025)    Exercise Vital Sign     Days of Exercise per Week: 4 days     Minutes of Exercise per Session: 30 min   Stress: Stress Concern Present (4/7/2025)    Citizen of [place] [employer] - Occupational Stress Questionnaire     Feeling of Stress : Very much   Housing Stability: High  Risk (4/7/2025)    Housing Stability Vital Sign     Unable to Pay for Housing in the Last Year: Yes     Number of Times Moved in the Last Year: 0     Homeless in the Last Year: No

## 2025-07-01 NOTE — TELEPHONE ENCOUNTER
I spoke with the patient and let her know that her MRI shows she has inflammation in his sacroiliac joint on both sides but she has right-sided trochanter bursitis.  I am ordering a Medrol Dosepak

## 2025-07-10 ENCOUNTER — TELEPHONE (OUTPATIENT)
Dept: OBSTETRICS AND GYNECOLOGY | Facility: CLINIC | Age: 35
End: 2025-07-10
Payer: MEDICAID

## 2025-07-10 NOTE — TELEPHONE ENCOUNTER
Notified patient of pap results. Notified patient that we will speak to AH tomorrow and see if it needs to be addressed d/t her history. Pt verbalized understanding.

## 2025-07-10 NOTE — TELEPHONE ENCOUNTER
----- Message from Brylee sent at 7/10/2025  1:36 PM CDT -----  Regarding: Results  Contact: Barbie  Type:  Test Results    Who Called: Barbie  Name of Test (Lab/Mammo/Etc): pap smear  Date of Test:   Ordering Provider:   Where the test was performed:   Would the patient rather a call back or a response via [facility]? Call back  Best Call Back Number: [phone]   Additional Information:  Pt called requesting her results

## 2025-07-11 ENCOUNTER — HOSPITAL ENCOUNTER (OUTPATIENT)
Dept: RADIOLOGY | Facility: HOSPITAL | Age: 35
Discharge: HOME | End: 2025-07-11
Payer: MEDICAID

## 2025-07-11 DIAGNOSIS — R10.2 RIGHT ADNEXAL TENDERNESS: ICD-10-CM

## 2025-07-11 PROCEDURE — 76856 US EXAM PELVIC COMPLETE: CPT | Mod: TC

## 2025-07-15 ENCOUNTER — OFFICE VISIT (OUTPATIENT)
Dept: FAMILY MEDICINE | Facility: CLINIC | Age: 35
End: 2025-07-15
Payer: MEDICAID

## 2025-07-15 VITALS
WEIGHT: 140 LBS | TEMPERATURE: 98 F | BODY MASS INDEX: 26.43 KG/M2 | HEART RATE: 73 BPM | HEIGHT: 61 IN | SYSTOLIC BLOOD PRESSURE: 108 MMHG | DIASTOLIC BLOOD PRESSURE: 69 MMHG | RESPIRATION RATE: 20 BRPM

## 2025-07-15 DIAGNOSIS — M25.551 RIGHT HIP PAIN: ICD-10-CM

## 2025-07-15 DIAGNOSIS — M62.838 MUSCLE SPASM: ICD-10-CM

## 2025-07-15 DIAGNOSIS — M70.61 TROCHANTERIC BURSITIS OF RIGHT HIP: ICD-10-CM

## 2025-07-15 RX ORDER — TIZANIDINE 2 MG/1
4 TABLET ORAL EVERY 8 HOURS
Qty: 60 TABLET | Refills: 0 | Status: SHIPPED | OUTPATIENT
Start: 2025-07-15 | End: 2025-07-25

## 2025-07-15 RX ORDER — LIDOCAINE HYDROCHLORIDE 10 MG/ML
0.5 INJECTION, SOLUTION INFILTRATION; PERINEURAL
Status: DISCONTINUED | OUTPATIENT
Start: 2025-07-15 | End: 2025-07-15 | Stop reason: HOSPADM

## 2025-07-15 RX ORDER — METHYLPREDNISOLONE ACETATE 40 MG/ML
40 INJECTION, SUSPENSION INTRA-ARTICULAR; INTRALESIONAL; INTRAMUSCULAR; SOFT TISSUE
Status: DISCONTINUED | OUTPATIENT
Start: 2025-07-15 | End: 2025-07-15 | Stop reason: HOSPADM

## 2025-07-15 RX ADMIN — METHYLPREDNISOLONE ACETATE 40 MG: 40 INJECTION, SUSPENSION INTRA-ARTICULAR; INTRALESIONAL; INTRAMUSCULAR; SOFT TISSUE at 08:07

## 2025-07-15 RX ADMIN — LIDOCAINE HYDROCHLORIDE 0.5 ML: 10 INJECTION, SOLUTION INFILTRATION; PERINEURAL at 08:07

## 2025-07-15 NOTE — PROCEDURES
Large Joint Aspiration/Injection: R greater trochanteric bursa    Date/Time: 7/15/2025 8:30 AM    Performed by: Nan Chao NP  Authorized by: Nan Chao NP    Consent Done?:  Yes (Verbal)  Indications:  Pain  Site marked: the procedure site was marked    Timeout: prior to procedure the correct patient, procedure, and site was verified    Local anesthesia used?: No      Details:  Needle Size:  22 G  Approach:  Anteromedial  Location:  Hip  Site:  R greater trochanteric bursa  Medications:  0.5 mL LIDOcaine HCL 10 mg/ml (1%) 10 mg/mL (1 %); 40 mg methylPREDNISolone acetate 40 mg/mL  Aspirate amount (mL):  0  Patient tolerance:  Patient tolerated the procedure well with no immediate complications

## 2025-07-15 NOTE — PROGRESS NOTES
"Subjective:       Patient ID: Barbie Marlow is a 35 y.o. female.    Chief Complaint: Hip Pain (Right hip pain X's 1 month constant)      The patient is a 35-year-old female who presents for right hip pain.  The patient states the pain is also in her groin.  The patient states that the sciatica has resolved on gabapentin.  But the patient states that even after the Medrol Dosepak in cyclobenzaprine her right hip is still extremely painful.  Impression from MRI performed on June 24th is as follows:  Impression:     Chronic bilateral sacroiliitis without erosion.     Mild right-sided trochanteric bursitis.        Electronically signed by:Arsenio oRberson  Date:                                            06/25/2025  Time:                                           18:17      Exam Ended: 06/24/25 11:32 CDT    Discussed options with the patient.  One option is physical therapy.  Patient agreed to physical therapy.  Patient would also like a targeted steroid injection today.    Hip Pain   The incident occurred more than 1 week ago. The incident occurred at home. There was no injury mechanism. The pain is present in the right hip. The quality of the pain is described as aching, burning, cramping, shooting and stabbing. The pain is at a severity of 9/10. The pain is severe. The pain has been Constant since onset. Associated symptoms include an inability to bear weight and a loss of motion. She reports no foreign bodies present. The symptoms are aggravated by movement, palpation and weight bearing. She has tried ice, heat, elevation, NSAIDs and rest for the symptoms. The treatment provided mild relief.     Review of [id number] point review of systems conducted, negative except as stated in the history of present illness. See HPI for details.      Objective:      Visit Vitals  /69   Pulse 73   Temp 98.3 °F (36.8 °C)   Resp 20   Ht 5' 1" (1.549 m)   Wt 63.5 kg (140 lb)   LMP 06/19/2025 (Approximate)   BMI 26.45 " kg/m²     Physical Exam  Vitals and nursing note reviewed.   HENT:      Head: Normocephalic.      Right Ear: Tympanic membrane normal.      Left Ear: Tympanic membrane normal.      Nose: Nose normal.      Mouth/Throat:      Mouth: Mucous membranes are moist.   Eyes:      Extraocular Movements: Extraocular movements intact.   Cardiovascular:      Rate and Rhythm: Normal rate and regular rhythm.      Heart sounds: No murmur heard.  Pulmonary:      Effort: Pulmonary effort is normal.      Breath sounds: Normal breath sounds.   Abdominal:      General: Bowel sounds are normal.      Palpations: Abdomen is soft.   Musculoskeletal:         General: Tenderness present.      Cervical back: Normal range of motion and neck supple.      Right hip: Tenderness present. Decreased range of motion.   Skin:     General: Skin is warm and dry.   Neurological:      Mental Status: She is alert and oriented to person, place, and time.       Current Outpatient Medications   Medication Instructions    amoxicillin (AMOXIL) 500 mg, 3 times daily    azelastine (ASTELIN) 137 mcg, Nasal, 2 times daily    cyclobenzaprine (FLEXERIL) 10 mg, Oral, 3 times daily PRN    fluticasone propionate (FLONASE) 50 mcg, Each Nostril, Daily    gabapentin (NEURONTIN) 300 mg, Oral, 3 times daily    HYDROcodone-acetaminophen (NORCO) 5-325 mg per tablet 2 tablets, 2 times daily    ibuprofen (ADVIL,MOTRIN) 600 mg, 3 times daily    lamoTRIgine (LAMICTAL) 100 mg, Oral, Daily    loratadine (CLARITIN) 10 mg, Oral, Daily    methylPREDNISolone (MEDROL DOSEPACK) 4 mg tablet use as directed    montelukast (SINGULAIR) 10 mg, Oral, Nightly    nabumetone (RELAFEN) 500 mg, Oral, 2 times daily PRN, Take with food    tiZANidine (ZANAFLEX) 4 mg, Oral, Every 8 hours    venlafaxine (EFFEXOR-XR) 75 mg, Oral, Daily     has no known allergies.       Assessment:         ICD-10-CM ICD-9-CM   1. Trochanteric bursitis of right hip  M70.61 726.5   2. Right hip pain  M25.551 719.45   3.  Muscle spasm  M62.838 728.85          Plan:       1. Trochanteric bursitis of right hip  - Large Joint Aspiration/Injection: R greater trochanteric bursa  - LIDOcaine HCL 10 mg/ml (1%) injection 0.5 mL  - methylPREDNISolone acetate injection 40 mg    2. Right hip pain  - Ambulatory Referral/Consult to Physical Therapy; Future  - tiZANidine (ZANAFLEX) 2 MG tablet; Take 2 tablets (4 mg total) by mouth every 8 (eight) hours. for 10 days  Dispense: 60 tablet; Refill: 0  - Large Joint Aspiration/Injection: R greater trochanteric bursa  - LIDOcaine HCL 10 mg/ml (1%) injection 0.5 mL  - methylPREDNISolone acetate injection 40 mg    3. Muscle spasm  - tiZANidine (ZANAFLEX) 2 MG tablet; Take 2 tablets (4 mg total) by mouth every 8 (eight) hours. for 10 days  Dispense: 60 tablet; Refill: 0        Follow up if symptoms worsen or fail to improve.     Future Appointments   Date Time Provider Department Center   12/16/2025  1:00 PM Nan Chao NP [facility] [place]   7/6/2026  9:00 AM Farida Lemus [facility]

## 2025-07-17 DIAGNOSIS — M70.61 TROCHANTERIC BURSITIS OF RIGHT HIP: Primary | ICD-10-CM

## 2025-07-17 DIAGNOSIS — M25.551 RIGHT HIP PAIN: ICD-10-CM
